# Patient Record
(demographics unavailable — no encounter records)

---

## 2024-10-15 NOTE — ASSESSMENT
[Future Reassessment of Pain Scale] : Future reassessment of pain scale    [Medication(s)] : Medication(s) [Referred to Palliative/Pain management] : Referred to Palliative/Pain management [Palliative] : Goals of care discussed with patient: Palliative [Reviewed no changes] : Reviewed no changes [Designated Health Care Proxy] : Designated Health Care Proxy [Name: ___] : Name: [unfilled] [Relationship: ___] : Relationship: [unfilled] [FreeTextEntry1] : 54 y/o man with metastatic pancreatic cancer. ECOG 0. He denies symptoms at this time; if anything has some mild appetite loss and mild weight loss but fully active. He denies pain. We reviewed treatment options today including PASS-01 trial randomizing FOLFIRINOX to gemcitabine/nab-paclitaxel. He elects to proceed with trial. He is aware that he will need a biopsy of the liver, which we would plan to do anyway given the concern on MRI of liver lesion. Does not need a PET but does need new CT CAP as last scan was done >4 weeks ago. The cancer carries a heavy emotional weight for him, he was tearful at times during the interview so we will have SW help get involved. We also talked about our research study for ctDNA testing and he is interested, he says he wants to do whatever he can to help other patients with this deadly disease, and we are grateful to have his interest.  7/25: tolerating reduced dose FOLFIRINOX much better, far fewer symptoms, will continue at these doses 9/5: he is not tolerating chemotherapy well, with fatigue and nausea. He did not report these side effects until today. He also says the treatments are putting him thousands of dollars in debt but reluctant to discuss with a  available through [employer]. He is having a difficult time emotionally with the disease but does not wish to speak with a therapist.  9/19: he did not follow up with a therapist or  "I'm stubborn, I know i'm stubborn". He had a difficult time with s/e from chemotherapy and is agreeable to resume but wants to avoid side effects. Given that his s/e were likely related mostly to irintoecan, will skip that for a couple of doses and see how he does. Advised him strongly to let us know if he is having any symptoms and we will schedule a couple of calls to check in on him.  10/3: feels great with FOLFOX wants to continue at current regimen/doses which we will do as he is tolerating well  10/31: feels well on FOLFOX, scan shows stable disease  12/26/23: s/p cycle 4 of FOLFIRNOX course complicated by diarrhea, neuropathy, constitutional sx followed by cycle 6 of FOLFOX. CBC along with restaging CT CAP scans from Dec 18 reviewed and stable, to start maintenance 5FU with leucovorin today.  2/23/24; tolerating treatment very well, plans to switch to tx Friday to accomodate his work schedule  4/4/24: scan classifies as POD by recist because of a peritoneal nodule that is 1.2 cm, however the scan is clinically stable and he is clinically stable, therefore will continue current treatment.  6/6/24: clinically very well, expressed the pros/cons of adding back additional therapy - if we add we may be able to delay progression at expense of causing some side effects. Mostly his disease is stable with exception of asymptomatic lesion.  stable He feels great, tolerating well, so he wishes to stay at q2w 5FU for now which I think is very reasonable since he is tolerating the regimen well and the disease is low burden.  6/11/24: reviewed scan and his symptoms with his whole family and again reviewed discussion from last visit comparing a balance of side effects of treatment, and maintenance dosing. Current dosing appears to be a good balance between side effects and treatment efficacy and of course we will reassess at each visit. The patient and family will celebrate his good performance status with consideration of renting a camper.   6/27: he is still doing well, says he feels very well, working full time, enjoying the summer with his family. He has intermittent abdominal pain which is a little concerning that disease may be growing but he describes this the discomfort as intermittent and goes away after eating so it is uncertain whether this is disease related. He is due for scan after next dose so we will re-evaluate.  8/1/24 discussed scan results showing stable disease but with a small amount of growth in peritoneal nodules and slight bump in . However, he is experiencing a great quality of life; he is working full time with very minimal side effects, and no symptoms of the cancer. I provided him the option of increasing the chemo, perhaps by adding back irinotecan, but he does not want to make any changes now and I think maintaining 5_FU maintenance option is also a reasonable option given no substantial growth of his cancer.  8/22/24 he wishes to take a chemotherapy holiday given that he is feeling great, has no symptoms, "I know if i go for the chemo i'm goign to be shot for a few days and i dont want that to happen. I'm having a great time time right now we went racing [on vacation]" He wishes to resume treatment in September  10/15/24 he returns after a chemotherapy holiday. He has developed increasing symptom burden. I recommended resuming chemotherapy to reduce the symptom burden of his cancer. He asked about whether resuming chemotherapy would cause symptoms and I admitted that chemotherapy certainly can have side effects; he did have s/e when he was previously on FOLFIRINOX; though we are generally quite effective at finding a regimen and dose that can still put pressure on the cancer without causing as many side effects. I did also discuss that while chemotherapy is likely to prevent cancer growth, I cannot guarantee that his tumor will respond to chemotherapy; response rates in the 2L setting tend to be lower, 10-20% than in the first line setting. He is concerned that resuming treatment will also cause an increase in symptom burden. He adamant about not pursuing more chemotherapy. He does not want to endure side effects of treatment even if the treatments may improve his changes at long term survival. His family who were with him today were present and participated in the discussion.  Discussed that he is welcome to schedule a visit back with me at any time.  Plan: - hospice referral - [facility] MARIETTA De La Cruz MD PhD Medical Oncology Attending I personally have spent a total of 45 minutes of time on the date of this encounter reviewing test results, documenting findings, coordinating care, and directly consulting with the patient and/or designated family member.  [AdvancecareDate] : 11/14/23

## 2024-10-15 NOTE — ASSESSMENT
[Future Reassessment of Pain Scale] : Future reassessment of pain scale    [Medication(s)] : Medication(s) [Referred to Palliative/Pain management] : Referred to Palliative/Pain management [Palliative] : Goals of care discussed with patient: Palliative [Reviewed no changes] : Reviewed no changes [Designated Health Care Proxy] : Designated Health Care Proxy [Name: ___] : Name: [unfilled] [Relationship: ___] : Relationship: [unfilled] [FreeTextEntry1] : 54 y/o man with metastatic pancreatic cancer. ECOG 0. He denies symptoms at this time; if anything has some mild appetite loss and mild weight loss but fully active. He denies pain. We reviewed treatment options today including PASS-01 trial randomizing FOLFIRINOX to gemcitabine/nab-paclitaxel. He elects to proceed with trial. He is aware that he will need a biopsy of the liver, which we would plan to do anyway given the concern on MRI of liver lesion. Does not need a PET but does need new CT CAP as last scan was done >4 weeks ago. The cancer carries a heavy emotional weight for him, he was tearful at times during the interview so we will have SW help get involved. We also talked about our research study for ctDNA testing and he is interested, he says he wants to do whatever he can to help other patients with this deadly disease, and we are grateful to have his interest.  7/25: tolerating reduced dose FOLFIRINOX much better, far fewer symptoms, will continue at these doses 9/5: he is not tolerating chemotherapy well, with fatigue and nausea. He did not report these side effects until today. He also says the treatments are putting him thousands of dollars in debt but reluctant to discuss with a  available through [employer]. He is having a difficult time emotionally with the disease but does not wish to speak with a therapist.  9/19: he did not follow up with a therapist or  "I'm stubborn, I know i'm stubborn". He had a difficult time with s/e from chemotherapy and is agreeable to resume but wants to avoid side effects. Given that his s/e were likely related mostly to irintoecan, will skip that for a couple of doses and see how he does. Advised him strongly to let us know if he is having any symptoms and we will schedule a couple of calls to check in on him.  10/3: feels great with FOLFOX wants to continue at current regimen/doses which we will do as he is tolerating well  10/31: feels well on FOLFOX, scan shows stable disease  12/26/23: s/p cycle 4 of FOLFIRNOX course complicated by diarrhea, neuropathy, constitutional sx followed by cycle 6 of FOLFOX. CBC along with restaging CT CAP scans from Dec 18 reviewed and stable, to start maintenance 5FU with leucovorin today.  2/23/24; tolerating treatment very well, plans to switch to tx Friday to accomodate his work schedule  4/4/24: scan classifies as POD by recist because of a peritoneal nodule that is 1.2 cm, however the scan is clinically stable and he is clinically stable, therefore will continue current treatment.  6/6/24: clinically very well, expressed the pros/cons of adding back additional therapy - if we add we may be able to delay progression at expense of causing some side effects. Mostly his disease is stable with exception of asymptomatic lesion.  stable He feels great, tolerating well, so he wishes to stay at q2w 5FU for now which I think is very reasonable since he is tolerating the regimen well and the disease is low burden.  6/11/24: reviewed scan and his symptoms with his whole family and again reviewed discussion from last visit comparing a balance of side effects of treatment, and maintenance dosing. Current dosing appears to be a good balance between side effects and treatment efficacy and of course we will reassess at each visit. The patient and family will celebrate his good performance status with consideration of renting a camper.   6/27: he is still doing well, says he feels very well, working full time, enjoying the summer with his family. He has intermittent abdominal pain which is a little concerning that disease may be growing but he describes this the discomfort as intermittent and goes away after eating so it is uncertain whether this is disease related. He is due for scan after next dose so we will re-evaluate.  8/1/24 discussed scan results showing stable disease but with a small amount of growth in peritoneal nodules and slight bump in . However, he is experiencing a great quality of life; he is working full time with very minimal side effects, and no symptoms of the cancer. I provided him the option of increasing the chemo, perhaps by adding back irinotecan, but he does not want to make any changes now and I think maintaining 5_FU maintenance option is also a reasonable option given no substantial growth of his cancer.  8/22/24 he wishes to take a chemotherapy holiday given that he is feeling great, has no symptoms, "I know if i go for the chemo i'm goign to be shot for a few days and i dont want that to happen. I'm having a great time time right now we went racing [on vacation]" He wishes to resume treatment in September  10/15/24 he returns after a chemotherapy holiday. He has developed increasing symptom burden. I recommended resuming chemotherapy to reduce the symptom burden of his cancer. He asked about whether resuming chemotherapy would cause symptoms and I admitted that chemotherapy certainly can have side effects; he did have s/e when he was previously on FOLFIRINOX; though we are generally quite effective at finding a regimen and dose that can still put pressure on the cancer without causing as many side effects. I did also discuss that while chemotherapy is likely to prevent cancer growth, I cannot guarantee that his tumor will respond to chemotherapy; response rates in the 2L setting tend to be lower, 10-20% than in the first line setting. He is concerned that resuming treatment will also cause an increase in symptom burden. He adamant about not pursuing more chemotherapy. He does not want to endure side effects of treatment even if the treatments may improve his changes at long term survival. His family who were with him today were present and participated in the discussion.  Discussed that he is welcome to schedule a visit back with me at any time.  Plan: - hospice referral - [facility] MARIETTA De La Cruz MD PhD Medical Oncology Attending I personally have spent a total of 45 minutes of time on the date of this encounter reviewing test results, documenting findings, coordinating care, and directly consulting with the patient and/or designated family member.  [AdvancecareDate] : 11/14/23

## 2024-10-15 NOTE — REVIEW OF SYSTEMS
[Diarrhea: Grade 0] : Diarrhea: Grade 0 [Negative] : Allergic/Immunologic [Recent Change In Weight] : ~T no recent weight change [Chest Pain] : no chest pain [Shortness Of Breath] : no shortness of breath [Abdominal Pain] : no abdominal pain [Vomiting] : no vomiting [Constipation] : no constipation [Dysuria] : no dysuria

## 2024-10-15 NOTE — PHYSICAL EXAM
[Restricted in physically strenuous activity but ambulatory and able to carry out work of a light or sedentary nature] : Status 1- Restricted in physically strenuous activity but ambulatory and able to carry out work of a light or sedentary nature, e.g., light house work, office work [Cachectic] : cachectic [Normal] : full range of motion and no deformities appreciated [Ulcers] : no ulcers [Mucositis] : no mucositis [de-identified] : weight stable - LIMITED PE due to TEB [de-identified] : occasionally feels gassy  [de-identified] : Neuropathy on left hand -  stopped taking Gabapentin

## 2024-10-15 NOTE — PHYSICAL EXAM
[Restricted in physically strenuous activity but ambulatory and able to carry out work of a light or sedentary nature] : Status 1- Restricted in physically strenuous activity but ambulatory and able to carry out work of a light or sedentary nature, e.g., light house work, office work [Cachectic] : cachectic [Normal] : full range of motion and no deformities appreciated [Ulcers] : no ulcers [Mucositis] : no mucositis [de-identified] : weight stable - LIMITED PE due to TEB [de-identified] : occasionally feels gassy  [de-identified] : Neuropathy on left hand -  stopped taking Gabapentin

## 2024-10-15 NOTE — HISTORY OF PRESENT ILLNESS
[Home] : at home, [unfilled] , at the time of the visit. [Medical Office: ([facility])___] : at the medical office located in  [Verbal consent obtained from patient] : the patient, [unfilled] [ECOG Performance Status: 0 - Fully active, able to carry on all pre-disease performance without restriction] : Performance Status: 0 - Fully active, able to carry on all pre-disease performance without restriction [de-identified] : ID: [id number] y/o man with stage IV pancreatic cancer on PASS-01 randomized to FOLFIRINOX, and participating in ctDNA study.  Chronological Hx of Cancer (including scans, in two column format): 04/30/23 presented for kidney stone; CT AP showed 4.6 cm cystic mass in tail of pancreas and liver lesions 05/01/23 MRCP showed mass pancreatic tail, 5.6 cm, at least 2 liver lesions, portocaval lymphadenopathy 05/30/23 EUS biopsy 3.1 cm showed adenocarcinoma, pMMR 06/10/23 MRCP MRI liver mets 2.8 cm increasing ins zie; sub-cm peritoneal nodule 06/15/23 CT CAP showed 8 cm pancreatic tail mass, two liver mets, peritoneal carcinomatosis,  involvement of celiac [id number] 06/22/23 liver biopsy confirmed metastatic adenocarcinoma 06/27/23 C1 FOLFORINOX  5FU 2400 mg/m2/ Oxali 85 mg/m2/ Irinotecan 150 mg/m2/  mg/m2  07/11/23 C2 FOLFORINOX - Dose reduction - 5 FU 2400 mg/m2/  mg/m2/ Oxali 70 mg/m2/ Irinotecan 125 mg/m2 ;  improving 07/25/23 C3 FOLFIRINOX 08/08/23 C4 chemo on hold - Pt feels overwhelmed with SE from chemo and is requesting time to recover - 08/18/23 CT scans- Mild increase in infiltrative soft tissue, otherwise stable disease in liver and retroperitoneal lymphadenopathy 08/22/23 C4 FOLFORINOX 09/07/23 patient deferred chemo today due to his preference 09/19/23 C5 resumed tx (FOLFOX) 10/03/23 C6 FOLFOX 10/17/23 C7 FOLFOX  10/23/23 CT CAP showed stable disease, -5% 10/31/23 C8 FOLFOX 11/14/23 C9 FOLFOX  11/27/23 C10 FOLFOX  12/11/23 C11 FOLFOX 12/26/23 CT CAP from 12/18: stable disease to start maintainence 5FU-leucovorin today.  12/26/23 C12 5FU  01/09/24 C13 5FU  01/23/24 C14 5FU  02/06/24 C15 5FU 02/15/24 CT CAP stable - lesions in liver and pancreas or same or within mm of each other; colitis? 02/23/24 C16 5FU 03/08/24 C17 5FU 03/22/24 C18 5FU  03/29/24 CT CAP stable disease, peritoneal lesion 1.2 vs 0.9 was considered POD by RECIST but overall burden of disease is very stable 04/04/24 C19 5FU  05/03/24 C20 5FU  05/17/24 C21 5FU 05/30/24 C22 5FU 06/01/24 CT CAP showed stable liver lesions, two new hypodensities in spleen, pancreatic tail mass stable, increased ascites, increase in size of peritoneal disease 06/14/24  1857 (was 1772 mid may) 06/14/24 C23 5FU  06/28/24 C24 5FU 07/11/24 C25 F5U 07/22/24 CT CAP stable pancreatic tail lesion, peritoneal implants with slight growth 2.5 from 1.8, stable peripancreatic RP 07/25/24 C26 5FU 08/08/24 C27 5FU 08/22/24 treatment holiday, followed by some missed visits 10/15/24 returns to clinic  MMR status (all GI cancers): pMMR Tumor Mutation Data (if advanced): KRAS TP53 in PASS-01 data Germline Data (if pancreas or young): negative Signatera/ctDNA testing: participating in ctDNA study  PMHx (those that will influence cancer management plan): kidney stone Social Hx (including place of residency, job, hobby): [place], , 4 sons  Additional Quality Metrics: Goals of Care discussion: yes Enyzmes considered: yes Palliative care referral considered: yes  [de-identified] : \par  Poorly differentiated adenocarcinoma. No loss of nuclear expression of MMR proteins (MLH1, MSH2, MSH6, and PMS2).  \par   [de-identified] : Invitae negative  [de-identified] : 10/15/24 - presents today with his wife and son - he had been feeling very well until the last 2-3 weeks - now he is developing increasing symptoms with worsening back and flank pain - he has lost much of his appetite and is losing some weight

## 2024-10-15 NOTE — RESULTS/DATA
[FreeTextEntry1] : 5/30/23 Path: Raiella hepatis lymph node (fine-needle aspiration): Positive for malignant cells. Adenocarcinoma. Pancreas, body, mass (fine needle biopsy and cytologic preparation): Poorly differentiated adenocarcinoma. No loss of nuclear expression of MMR proteins (MLH1, MSH2, MSH6, and PMS2).  \par  \par  5/30/23 EUS: guided fine needle biopsy of a 3.1 cm pancreatic body/tail mass and a 2 cm ariella hepatis lymph node.\par  \par  5/1/23 MR MRCP: Solid/cystic mass in the pancreatic tail incompletely characterized without IV contrast, but suspicious for primary pancreatic malignancy, measures 5.6 x 3.0 cm. Two indeterminate lesions in the right lobe of the liver suspicious for metastases, largest measures 1.2 cm. Portacaval lymphadenopathy, measures 2.6 x 1.2 cm. Right hydronephrosis likely residual from known obstructive ureteral calculus.\par  \par  5/1/23 CT Chest non-con: No pneumonia. No emphysema. Pancreatic tail mass and indeterminate liver lesions.\par  \par  4/30/23 CT A/P: Mild right hydronephrosis secondary to 3 mm right proximal ureteral stone . Additional punctate nonobstructive left renal calculi identified. Right perinephric stranding extending to the ureter. Mild bladder wall thickening, difficult to assess secondary to inadequate distention. Correlate with urinalysis and lab values to assess for \par  cystitis and/or ascending urinary tract infection. Incompletely characterized complex cystic lesion at the tail the pancreas containing septations, 4.6 x 4.4 x 4 cm. Differential includes complex pseudocyst or neoplasm. Correlate with prior studies and consider dedicated MR for further workup.

## 2024-10-15 NOTE — RESULTS/DATA
[FreeTextEntry1] : 5/30/23 Path: Ariella hepatis lymph node (fine-needle aspiration): Positive for malignant cells. Adenocarcinoma. Pancreas, body, mass (fine needle biopsy and cytologic preparation): Poorly differentiated adenocarcinoma. No loss of nuclear expression of MMR proteins (MLH1, MSH2, MSH6, and PMS2).  \par  \par  5/30/23 EUS: guided fine needle biopsy of a 3.1 cm pancreatic body/tail mass and a 2 cm ariella hepatis lymph node.\par  \par  5/1/23 MR MRCP: Solid/cystic mass in the pancreatic tail incompletely characterized without IV contrast, but suspicious for primary pancreatic malignancy, measures 5.6 x 3.0 cm. Two indeterminate lesions in the right lobe of the liver suspicious for metastases, largest measures 1.2 cm. Portacaval lymphadenopathy, measures 2.6 x 1.2 cm. Right hydronephrosis likely residual from known obstructive ureteral calculus.\par  \par  5/1/23 CT Chest non-con: No pneumonia. No emphysema. Pancreatic tail mass and indeterminate liver lesions.\par  \par  4/30/23 CT A/P: Mild right hydronephrosis secondary to 3 mm right proximal ureteral stone . Additional punctate nonobstructive left renal calculi identified. Right perinephric stranding extending to the ureter. Mild bladder wall thickening, difficult to assess secondary to inadequate distention. Correlate with urinalysis and lab values to assess for \par  cystitis and/or ascending urinary tract infection. Incompletely characterized complex cystic lesion at the tail the pancreas containing septations, 4.6 x 4.4 x 4 cm. Differential includes complex pseudocyst or neoplasm. Correlate with prior studies and consider dedicated MR for further workup.

## 2024-12-19 NOTE — HISTORY OF PRESENT ILLNESS
How Severe Are Your Spot(S)?: mild Have Your Spot(S) Been Treated In The Past?: has not been treated Hpi Title: Evaluation of a Skin Lesion [Home] : at home, [unfilled] , at the time of the visit. [Medical Office: ([facility])___] : at the medical office located in  [Verbal consent obtained from patient] : the patient, [unfilled] [ECOG Performance Status: 0 - Fully active, able to carry on all pre-disease performance without restriction] : Performance Status: 0 - Fully active, able to carry on all pre-disease performance without restriction [de-identified] : ID: [id number] y/o man with stage IV pancreatic cancer on PASS-01 randomized to FOLFIRINOX, and participating in ctDNA study.  Chronological Hx of Cancer (including scans, in two column format): 04/30/23 presented for kidney stone; CT AP showed 4.6 cm cystic mass in tail of pancreas and liver lesions 05/01/23 MRCP showed mass pancreatic tail, 5.6 cm, at least 2 liver lesions, portocaval lymphadenopathy 05/30/23 EUS biopsy 3.1 cm showed adenocarcinoma, pMMR 06/10/23 MRCP MRI liver mets 2.8 cm increasing ins zie; sub-cm peritoneal nodule 06/15/23 CT CAP showed 8 cm pancreatic tail mass, two liver mets, peritoneal carcinomatosis,  involvement of celiac [id number] 06/22/23 liver biopsy confirmed metastatic adenocarcinoma 06/27/23 C1 FOLFORINOX  5FU 2400 mg/m2/ Oxali 85 mg/m2/ Irinotecan 150 mg/m2/  mg/m2  07/11/23 C2 FOLFORINOX - Dose reduction - 5 FU 2400 mg/m2/  mg/m2/ Oxali 70 mg/m2/ Irinotecan 125 mg/m2 ;  improving 07/25/23 C3 FOLFIRINOX 08/08/23 C4 chemo on hold - Pt feels overwhelmed with SE from chemo and is requesting time to recover - 08/18/23 CT scans- Mild increase in infiltrative soft tissue, otherwise stable disease in liver and retroperitoneal lymphadenopathy 08/22/23 C4 FOLFORINOX 09/07/23 patient deferred chemo today due to his preference 09/19/23 C5 resumed tx (FOLFOX) 10/03/23 C6 FOLFOX 10/17/23 C7 FOLFOX  10/23/23 CT CAP showed stable disease, -5% 10/31/23 C8 FOLFOX 11/14/23 C9 FOLFOX  11/27/23 C10 FOLFOX  12/11/23 C11 FOLFOX 12/26/23 CT CAP from 12/18: stable disease to start maintainence 5FU-leucovorin today.  12/26/23 C12 5FU  01/09/24 C13 5FU  01/23/24 C14 5FU  02/06/24 C15 5FU 02/15/24 CT CAP stable - lesions in liver and pancreas or same or within mm of each other; colitis? 02/23/24 C16 5FU 03/08/24 C17 5FU 03/22/24 C18 5FU  03/29/24 CT CAP stable disease, peritoneal lesion 1.2 vs 0.9 was considered POD by RECIST but overall burden of disease is very stable 04/04/24 C19 5FU  05/03/24 C20 5FU  05/17/24 C21 5FU 05/30/24 C22 5FU 06/01/24 CT CAP showed stable liver lesions, two new hypodensities in spleen, pancreatic tail mass stable, increased ascites, increase in size of peritoneal disease 06/14/24  1857 (was 1772 mid may) 06/14/24 C23 5FU  06/28/24 C24 5FU 07/11/24 C25 F5U 07/22/24 CT CAP stable pancreatic tail lesion, peritoneal implants with slight growth 2.5 from 1.8, stable peripancreatic RP 07/25/24 C26 5FU 08/08/24 C27 5FU 08/22/24 treatment holiday, followed by some missed visits 10/15/24 returns to clinic  MMR status (all GI cancers): pMMR Tumor Mutation Data (if advanced): KRAS TP53 in PASS-01 data Germline Data (if pancreas or young): negative Signatera/ctDNA testing: participating in ctDNA study  PMHx (those that will influence cancer management plan): kidney stone Social Hx (including place of residency, job, hobby): [place], , 4 sons  Additional Quality Metrics: Goals of Care discussion: yes Enyzmes considered: yes Palliative care referral considered: yes  [de-identified] : \par  Poorly differentiated adenocarcinoma. No loss of nuclear expression of MMR proteins (MLH1, MSH2, MSH6, and PMS2).  \par   [de-identified] : [employer]ae negative  [de-identified] : 10/15/24 - presents today with his wife and son - he had been feeling very well until the last 2-3 weeks - now he is developing increasing symptoms with worsening back and flank pain - he has lost much of his appetite and is losing some weight